# Patient Record
Sex: FEMALE | Race: WHITE | NOT HISPANIC OR LATINO | Employment: OTHER | URBAN - NONMETROPOLITAN AREA
[De-identification: names, ages, dates, MRNs, and addresses within clinical notes are randomized per-mention and may not be internally consistent; named-entity substitution may affect disease eponyms.]

---

## 2021-05-06 DIAGNOSIS — M25.562 LEFT KNEE PAIN, UNSPECIFIED CHRONICITY: Primary | ICD-10-CM

## 2021-05-12 ENCOUNTER — OFFICE VISIT (OUTPATIENT)
Dept: ORTHOPEDIC SURGERY | Facility: CLINIC | Age: 76
End: 2021-05-12

## 2021-05-12 ENCOUNTER — HOSPITAL ENCOUNTER (OUTPATIENT)
Dept: GENERAL RADIOLOGY | Facility: HOSPITAL | Age: 76
Discharge: HOME | End: 2021-05-12
Admitting: ORTHOPAEDIC SURGERY

## 2021-05-12 VITALS
SYSTOLIC BLOOD PRESSURE: 143 MMHG | DIASTOLIC BLOOD PRESSURE: 79 MMHG | HEIGHT: 68 IN | HEART RATE: 65 BPM | WEIGHT: 242.6 LBS | BODY MASS INDEX: 36.77 KG/M2

## 2021-05-12 DIAGNOSIS — M17.12 PRIMARY OSTEOARTHRITIS OF LEFT KNEE: Primary | ICD-10-CM

## 2021-05-12 DIAGNOSIS — M25.562 LEFT KNEE PAIN, UNSPECIFIED CHRONICITY: ICD-10-CM

## 2021-05-12 PROCEDURE — 73562 X-RAY EXAM OF KNEE 3: CPT

## 2021-05-12 PROCEDURE — 73562 X-RAY EXAM OF KNEE 3: CPT | Performed by: RADIOLOGY

## 2021-05-12 PROCEDURE — 99203 OFFICE O/P NEW LOW 30 MIN: CPT | Performed by: ORTHOPAEDIC SURGERY

## 2021-05-12 PROCEDURE — 20610 DRAIN/INJ JOINT/BURSA W/O US: CPT | Performed by: ORTHOPAEDIC SURGERY

## 2021-05-12 RX ORDER — POLYETHYLENE GLYCOL 1000
POWDER (GRAM) MISCELLANEOUS
COMMUNITY

## 2021-05-12 RX ORDER — PANTOPRAZOLE SODIUM 40 MG/1
TABLET, DELAYED RELEASE ORAL
COMMUNITY
Start: 2021-04-15

## 2021-05-12 RX ORDER — LANOLIN ALCOHOL/MO/W.PET/CERES
CREAM (GRAM) TOPICAL
COMMUNITY
Start: 2021-04-15

## 2021-05-12 RX ORDER — METOPROLOL TARTRATE 100 MG/1
TABLET ORAL
COMMUNITY
Start: 2021-04-15

## 2021-05-12 RX ORDER — HYDROCODONE BITARTRATE AND ACETAMINOPHEN 10; 325 MG/1; MG/1
TABLET ORAL
COMMUNITY
Start: 2021-04-17

## 2021-05-12 RX ORDER — LEVOTHYROXINE SODIUM 0.03 MG/1
TABLET ORAL
COMMUNITY
Start: 2021-04-15 | End: 2021-08-09 | Stop reason: DRUGHIGH

## 2021-05-12 RX ORDER — SOTALOL HYDROCHLORIDE 80 MG/1
TABLET ORAL
COMMUNITY
Start: 2021-04-15

## 2021-05-12 RX ORDER — ISOSORBIDE MONONITRATE 30 MG/1
TABLET, EXTENDED RELEASE ORAL
COMMUNITY
Start: 2021-04-15

## 2021-05-12 RX ORDER — INSULIN GLARGINE 100 [IU]/ML
INJECTION, SOLUTION SUBCUTANEOUS
COMMUNITY
Start: 2021-05-10

## 2021-05-12 RX ORDER — LOSARTAN POTASSIUM 50 MG/1
50 TABLET ORAL DAILY
COMMUNITY

## 2021-05-12 RX ORDER — FUROSEMIDE 40 MG/1
TABLET ORAL
COMMUNITY
Start: 2021-04-15

## 2021-05-12 RX ORDER — ATORVASTATIN CALCIUM 40 MG/1
TABLET, FILM COATED ORAL
COMMUNITY
Start: 2021-04-22

## 2021-05-12 RX ORDER — GABAPENTIN 300 MG/1
CAPSULE ORAL
COMMUNITY
Start: 2021-04-17

## 2021-05-12 RX ORDER — ROPINIROLE 2 MG/1
TABLET, FILM COATED ORAL
COMMUNITY
Start: 2021-04-15

## 2021-05-12 RX ORDER — LORATADINE 10 MG/1
TABLET ORAL
COMMUNITY
Start: 2021-04-15

## 2021-05-12 RX ORDER — ASPIRIN 81 MG/1
TABLET ORAL
COMMUNITY
Start: 2021-02-22

## 2021-05-12 RX ORDER — DIGOXIN 125 UG/1
TABLET ORAL
COMMUNITY
Start: 2021-04-15

## 2021-05-12 RX ORDER — SITAGLIPTIN 100 MG/1
TABLET, FILM COATED ORAL
COMMUNITY
Start: 2021-04-15

## 2021-05-12 RX ORDER — MONTELUKAST SODIUM 10 MG/1
TABLET ORAL
COMMUNITY
Start: 2021-04-15

## 2021-05-12 RX ORDER — BACLOFEN 10 MG/1
TABLET ORAL
COMMUNITY
Start: 2021-04-15

## 2021-05-12 RX ORDER — ESCITALOPRAM OXALATE 10 MG/1
TABLET ORAL
COMMUNITY
Start: 2021-04-15

## 2021-05-12 RX ORDER — PROMETHAZINE HYDROCHLORIDE 12.5 MG/1
TABLET ORAL
COMMUNITY
Start: 2021-02-22

## 2021-05-12 RX ADMIN — LIDOCAINE HYDROCHLORIDE 5 ML: 20 INJECTION, SOLUTION INFILTRATION; PERINEURAL at 14:23

## 2021-05-12 RX ADMIN — METHYLPREDNISOLONE ACETATE 80 MG: 80 INJECTION, SUSPENSION INTRA-ARTICULAR; INTRALESIONAL; INTRAMUSCULAR; SOFT TISSUE at 14:23

## 2021-05-13 PROBLEM — M17.12 PRIMARY OSTEOARTHRITIS OF LEFT KNEE: Status: ACTIVE | Noted: 2021-05-13

## 2021-05-19 RX ORDER — METHYLPREDNISOLONE ACETATE 80 MG/ML
80 INJECTION, SUSPENSION INTRA-ARTICULAR; INTRALESIONAL; INTRAMUSCULAR; SOFT TISSUE
Status: COMPLETED | OUTPATIENT
Start: 2021-05-12 | End: 2021-05-12

## 2021-05-19 RX ORDER — LIDOCAINE HYDROCHLORIDE 20 MG/ML
5 INJECTION, SOLUTION INFILTRATION; PERINEURAL
Status: COMPLETED | OUTPATIENT
Start: 2021-05-12 | End: 2021-05-12

## 2021-06-23 ENCOUNTER — HOSPITAL ENCOUNTER (OUTPATIENT)
Dept: GENERAL RADIOLOGY | Facility: HOSPITAL | Age: 76
Discharge: HOME | End: 2021-06-23
Admitting: ORTHOPAEDIC SURGERY

## 2021-06-23 ENCOUNTER — OFFICE VISIT (OUTPATIENT)
Dept: ORTHOPEDIC SURGERY | Facility: CLINIC | Age: 76
End: 2021-06-23

## 2021-06-23 VITALS — HEIGHT: 68 IN | TEMPERATURE: 98.6 F | BODY MASS INDEX: 36.68 KG/M2 | WEIGHT: 242 LBS

## 2021-06-23 DIAGNOSIS — R20.2 NUMBNESS AND TINGLING OF LEFT LOWER EXTREMITY: Primary | ICD-10-CM

## 2021-06-23 DIAGNOSIS — R20.0 NUMBNESS AND TINGLING OF LEFT LOWER EXTREMITY: Primary | ICD-10-CM

## 2021-06-23 DIAGNOSIS — M54.50 LUMBAR PAIN: ICD-10-CM

## 2021-06-23 PROCEDURE — 72100 X-RAY EXAM L-S SPINE 2/3 VWS: CPT

## 2021-06-23 PROCEDURE — 72100 X-RAY EXAM L-S SPINE 2/3 VWS: CPT | Performed by: RADIOLOGY

## 2021-06-23 PROCEDURE — 99213 OFFICE O/P EST LOW 20 MIN: CPT | Performed by: ORTHOPAEDIC SURGERY

## 2021-06-23 NOTE — PROGRESS NOTES
Follow-up Visit         Patient: Lynne Combs  YOB: 1945  Date of Encounter: 06/23/2021      Chief  Complaint:   Chief Complaint   Patient presents with   • Left Knee - Follow-up, Pain         HPI:  Lynne Combs, 76 y.o. female presents in follow-up left leg pain with history of arthritis left knee she reports receiving steroid injection left knee in the past with good response she was last seen May 12, 2021 and provided intra-articular steroid injection describes only a few days relief.  That injection included aspiration with removal of 20 cc of serous fluid.  Reports that she received a few days relief and her pain is back.  She relates her increase in left leg pain to her stroke sustained January 2021 she reports being at the [facility] when her leg pain increased this was following her stroke.  Describes pain from her mid thigh to her foot.  She describes numbness in her foot at times.  She also gives history of back problems in the past and describes consultation with neurosurgeon in which she was offered surgery 10 years ago she did not return for follow-up.      Medical History:  Patient Active Problem List   Diagnosis   • Primary osteoarthritis of left knee     Past Medical History:   Diagnosis Date   • Arrhythmia    • Asthma    • Diabetes (CMS/[facility])    • GERD (gastroesophageal reflux disease)    • High cholesterol    • Hypertension    • Seizure disorder (CMS/[facility])    • Sleep apnea    • Stroke (cerebrum) (CMS/[facility])          Social History:  Social History     Socioeconomic History   • Marital status:      Spouse name: Not on file   • Number of children: Not on file   • Years of education: Not on file   • Highest education level: Not on file   Tobacco Use   • Smoking status: Never Smoker   • Smokeless tobacco: Never Used   Vaping Use   • Vaping Use: Never used   Substance and Sexual Activity   • Alcohol use: Never   • Drug use: Never   • Sexual activity: Defer          Current Medications:    Current Outpatient Medications:   •  Aspirin Adult Low Strength 81 MG EC tablet, , Disp: , Rfl:   •  atorvastatin (LIPITOR) 40 MG tablet, , Disp: , Rfl:   •  baclofen (LIORESAL) 10 MG tablet, , Disp: , Rfl:   •  Diclofenac Sodium (VOLTAREN) 1 % gel gel, , Disp: , Rfl:   •  Digox 125 MCG tablet, , Disp: , Rfl:   •  escitalopram (LEXAPRO) 10 MG tablet, , Disp: , Rfl:   •  ferrous sulfate 325 (65 FE) MG EC tablet, , Disp: , Rfl:   •  furosemide (LASIX) 40 MG tablet, , Disp: , Rfl:   •  gabapentin (NEURONTIN) 300 MG capsule, , Disp: , Rfl:   •  HYDROcodone-acetaminophen (NORCO)  MG per tablet, , Disp: , Rfl:   •  isosorbide mononitrate (IMDUR) 30 MG 24 hr tablet, , Disp: , Rfl:   •  Januvia 100 MG tablet, , Disp: , Rfl:   •  Lantus SoloStar 100 UNIT/ML injection pen, , Disp: , Rfl:   •  levothyroxine (SYNTHROID, LEVOTHROID) 25 MCG tablet, , Disp: , Rfl:   •  loratadine (CLARITIN) 10 MG tablet, , Disp: , Rfl:   •  losartan (COZAAR) 50 MG tablet, Take 50 mg by mouth Daily., Disp: , Rfl:   •  Magnesium Carbonate 250 MG/GM powder, Take  by mouth., Disp: , Rfl:   •  metoprolol tartrate (LOPRESSOR) 100 MG tablet, , Disp: , Rfl:   •  montelukast (SINGULAIR) 10 MG tablet, , Disp: , Rfl:   •  pantoprazole (PROTONIX) 40 MG EC tablet, , Disp: , Rfl:   •  Polyethylene Glycol 1000 powder, , Disp: , Rfl:   •  promethazine (PHENERGAN) 12.5 MG tablet, , Disp: , Rfl:   •  rOPINIRole (REQUIP) 2 MG tablet, , Disp: , Rfl:   •  sotalol (BETAPACE) 80 MG tablet, , Disp: , Rfl:       Allergies:  No Known Allergies      Family History:  Family History   Problem Relation Age of Onset   • Alzheimer's disease Father          Surgical History:  Past Surgical History:   Procedure Laterality Date   • APPENDECTOMY     • HYSTERECTOMY           Radiology:   XR Spine Lumbar 2 or 3 View    Result Date: 6/23/2021  Arthritic change.  This report was finalized on 6/23/2021 2:49 PM by Dr. Dave Duke MD.       Radiographs of lumbar spine today show advanced degenerative changes lower lumbar level with significant disc height loss.      Examination:   Examination lower extremity reveals straight leg raising positive at 40 degrees.  She has diminished sensation to the dorsum of her foot.  Knee exam reveals minimal effusion with mild medial joint line tenderness motion is full.   lumbar spine is straight and nontender in the lower lumbar area.    Assessment & Plan:   76 y.o. female returns in follow-up with left lower extremity pain history of left-sided stroke 6 months ago and history of chronic low back problems affecting her left leg.  With her limited response to intra-articular steroid injection convinced that her symptoms are entirely related to her left knee osteoarthritis.  Today we obtained radiographs of her lumbar spine showing advanced degenerative changes and disc space narrowing at a lower level.  We will request MRI of her lower lumbar spine and she will return once that is completed.         Diagnosis Plan   1. Numbness and tingling of left lower extremity  XR Spine Lumbar 2 or 3 View    MRI Lumbar Spine Without Contrast   2. Lumbar pain  XR Spine Lumbar 2 or 3 View    MRI Lumbar Spine Without Contrast             Cc:  Terrence Mclain MD              This document has been electronically signed by Jose Armando Marquez MD   June 23, 2021 15:36 EDT

## 2021-07-07 ENCOUNTER — HOSPITAL ENCOUNTER (OUTPATIENT)
Dept: MRI IMAGING | Facility: HOSPITAL | Age: 76
Discharge: HOME | End: 2021-07-07
Admitting: ORTHOPAEDIC SURGERY

## 2021-07-07 DIAGNOSIS — R20.2 NUMBNESS AND TINGLING OF LEFT LOWER EXTREMITY: ICD-10-CM

## 2021-07-07 DIAGNOSIS — M54.50 LUMBAR PAIN: ICD-10-CM

## 2021-07-07 DIAGNOSIS — R20.0 NUMBNESS AND TINGLING OF LEFT LOWER EXTREMITY: ICD-10-CM

## 2021-07-07 PROCEDURE — 72148 MRI LUMBAR SPINE W/O DYE: CPT | Performed by: RADIOLOGY

## 2021-07-07 PROCEDURE — 72148 MRI LUMBAR SPINE W/O DYE: CPT

## 2021-07-30 ENCOUNTER — OFFICE VISIT (OUTPATIENT)
Dept: ORTHOPEDIC SURGERY | Facility: CLINIC | Age: 76
End: 2021-07-30

## 2021-07-30 VITALS — HEIGHT: 68 IN | BODY MASS INDEX: 36.75 KG/M2 | WEIGHT: 242.51 LBS | TEMPERATURE: 97.3 F

## 2021-07-30 DIAGNOSIS — M51.16 LUMBAR DISC DISEASE WITH RADICULOPATHY: Primary | ICD-10-CM

## 2021-07-30 DIAGNOSIS — M79.605 LEG PAIN, LEFT: ICD-10-CM

## 2021-07-30 DIAGNOSIS — M51.36 DDD (DEGENERATIVE DISC DISEASE), LUMBAR: ICD-10-CM

## 2021-07-30 PROCEDURE — 99213 OFFICE O/P EST LOW 20 MIN: CPT | Performed by: ORTHOPAEDIC SURGERY

## 2021-08-09 ENCOUNTER — OFFICE VISIT (OUTPATIENT)
Dept: NEUROSURGERY | Facility: CLINIC | Age: 76
End: 2021-08-09

## 2021-08-09 VITALS
SYSTOLIC BLOOD PRESSURE: 132 MMHG | DIASTOLIC BLOOD PRESSURE: 66 MMHG | BODY MASS INDEX: 37.01 KG/M2 | WEIGHT: 244.2 LBS | TEMPERATURE: 96.8 F | HEIGHT: 68 IN

## 2021-08-09 DIAGNOSIS — M51.36 BULGE OF LUMBAR DISC WITHOUT MYELOPATHY: ICD-10-CM

## 2021-08-09 DIAGNOSIS — M51.36 DDD (DEGENERATIVE DISC DISEASE), LUMBAR: Primary | ICD-10-CM

## 2021-08-09 PROCEDURE — 99204 OFFICE O/P NEW MOD 45 MIN: CPT | Performed by: STUDENT IN AN ORGANIZED HEALTH CARE EDUCATION/TRAINING PROGRAM

## 2021-08-09 RX ORDER — LEVOTHYROXINE SODIUM 0.05 MG/1
TABLET ORAL
COMMUNITY
Start: 2021-08-05

## 2021-08-09 NOTE — PROGRESS NOTES
Patient: Lynne Combs  : 1945    Primary Care Provider: Terrence Mclain MD    Requesting Provider: As above      Chief Complaint: Back Pain, Leg Pain (L>R), Numbness (LLE), and Extremity Weakness (LLE)      History of Present Illness: This is a 76 y.o. female who presents several months of left lower extremity pain.  The patient sustained a stroke last year resulting in weakness of her left upper extremity.  During the recovery of that stroke, she started to develop pain in her left lower extremity.  Describes his pain as burning in nature.  It starts in her back and radiates down the posterior aspect of her leg.  She endorses subjective weakness from this pain but denies any bowel or bladder dysfunction.  The patient was being followed by an orthopedic surgeon for her lower extremity pain he attempted a intra-articular knee injection which she states improved her pain for approximately 2 to 3 days.  Her pain then returned.  She is currently taking hydrocodone for pain management.  She was referred to me for evaluation of her lumbar spine as a cause of her left lower extremity pain.    PMHX  Allergies:  No Known Allergies  Medications    Current Outpatient Medications:   •  Aspirin Adult Low Strength 81 MG EC tablet, , Disp: , Rfl:   •  atorvastatin (LIPITOR) 40 MG tablet, , Disp: , Rfl:   •  baclofen (LIORESAL) 10 MG tablet, , Disp: , Rfl:   •  Diclofenac Sodium (VOLTAREN) 1 % gel gel, , Disp: , Rfl:   •  Digox 125 MCG tablet, , Disp: , Rfl:   •  escitalopram (LEXAPRO) 10 MG tablet, , Disp: , Rfl:   •  ferrous sulfate 325 (65 FE) MG EC tablet, , Disp: , Rfl:   •  furosemide (LASIX) 40 MG tablet, , Disp: , Rfl:   •  gabapentin (NEURONTIN) 300 MG capsule, , Disp: , Rfl:   •  HYDROcodone-acetaminophen (NORCO)  MG per tablet, , Disp: , Rfl:   •  isosorbide mononitrate (IMDUR) 30 MG 24 hr tablet, , Disp: , Rfl:   •  Januvia 100 MG tablet, , Disp: , Rfl:   •  Lantus SoloStar 100 UNIT/ML injection  pen, , Disp: , Rfl:   •  loratadine (CLARITIN) 10 MG tablet, , Disp: , Rfl:   •  losartan (COZAAR) 50 MG tablet, Take 50 mg by mouth Daily., Disp: , Rfl:   •  Magnesium Carbonate 250 MG/GM powder, Take  by mouth., Disp: , Rfl:   •  metoprolol tartrate (LOPRESSOR) 100 MG tablet, , Disp: , Rfl:   •  montelukast (SINGULAIR) 10 MG tablet, , Disp: , Rfl:   •  pantoprazole (PROTONIX) 40 MG EC tablet, , Disp: , Rfl:   •  Polyethylene Glycol 1000 powder, , Disp: , Rfl:   •  promethazine (PHENERGAN) 12.5 MG tablet, , Disp: , Rfl:   •  rOPINIRole (REQUIP) 2 MG tablet, , Disp: , Rfl:   •  sotalol (BETAPACE) 80 MG tablet, , Disp: , Rfl:   •  levothyroxine (SYNTHROID, LEVOTHROID) 50 MCG tablet, , Disp: , Rfl:   Past Medical History:  Past Medical History:   Diagnosis Date   • Arrhythmia    • Asthma    • Diabetes (CMS/[facility])    • GERD (gastroesophageal reflux disease)    • High cholesterol    • Hypertension    • Seizure disorder (CMS/[facility])    • Sleep apnea    • Stroke (cerebrum) (CMS/[facility])      Past Surgical History:  Past Surgical History:   Procedure Laterality Date   • APPENDECTOMY     • HYSTERECTOMY       Social Hx:  Social History     Tobacco Use   • Smoking status: Never Smoker   • Smokeless tobacco: Never Used   Vaping Use   • Vaping Use: Never used   Substance Use Topics   • Alcohol use: Never   • Drug use: Never     Family Hx:  Family History   Problem Relation Age of Onset   • Alzheimer's disease Father      Review of Systems:        Review of Systems   Constitutional: Negative for activity change, appetite change, chills, diaphoresis, fatigue, fever and unexpected weight change.   HENT: Negative for congestion, dental problem, drooling, ear discharge, ear pain, facial swelling, hearing loss, mouth sores, nosebleeds, postnasal drip, rhinorrhea, sinus pressure, sneezing, sore throat, tinnitus, trouble swallowing and voice change.    Eyes: Negative for photophobia, pain, discharge, redness, itching and visual disturbance.  "  Respiratory: Negative for apnea, cough, choking, chest tightness, shortness of breath, wheezing and stridor.    Cardiovascular: Negative for chest pain, palpitations and leg swelling.   Gastrointestinal: Negative for abdominal distention, abdominal pain, anal bleeding, blood in stool, constipation, diarrhea, nausea, rectal pain and vomiting.   Endocrine: Negative for cold intolerance, heat intolerance, polydipsia, polyphagia and polyuria.   Genitourinary: Negative for decreased urine volume, difficulty urinating, dysuria, enuresis, flank pain, frequency, genital sores, hematuria and urgency.   Musculoskeletal: Positive for back pain. Negative for arthralgias, gait problem, joint swelling, myalgias, neck pain and neck stiffness.   Skin: Negative for color change, pallor, rash and wound.   Allergic/Immunologic: Negative for environmental allergies, food allergies and immunocompromised state.   Neurological: Negative for dizziness, tremors, seizures, syncope, facial asymmetry, speech difficulty, weakness, light-headedness, numbness and headaches.   Hematological: Negative for adenopathy. Does not bruise/bleed easily.   Psychiatric/Behavioral: Negative for agitation, behavioral problems, confusion, decreased concentration, dysphoric mood, hallucinations, self-injury, sleep disturbance and suicidal ideas. The patient is not nervous/anxious and is not hyperactive.    All other systems reviewed and are negative.       Physical Exam:   /66 (BP Location: Right arm, Patient Position: Sitting, Cuff Size: Adult)   Temp 96.8 °F (36 °C) (Infrared)   Ht 171.5 cm (67.5\")   Wt 111 kg (244 lb 3.2 oz)   BMI 37.68 kg/m²   Awake, alert and oriented x 3  Speech f/c  Opens eyes spont  Pupils 3 mm rx bilaterally  EOMI  Normal sensation to light touch in all 3 distributions of CN V bilaterally  FS  TML  5/5 in all 4 ext  Normal sensation to light touch in all 4 ext  2+DTR's  No howell's or clonus bilaterally  No PD or " dysmetria  Gait not assessed    Diagnostic Studies:  All neurological imaging studies were independently reviewed unless stated otherwise    Assessment/Plan:  This is a 76 y.o. female presenting with 1 year history of left lower extremity pain.  In reviewing the patient's MRI she has mild degenerative changes diffusely.  She does have neural foraminal stenosis and a disc bulge at the L5-S1 level which does cause mild compression of the traversing left S1 nerve root. It is possible that this is the cause of her left lower extremity pain, however the fact that she had significant improvement with an intra-articular knee injection makes me suspicious that this is not the etiology of her pain.  To better delineate this, I am going to refer her for a selective nerve root block of the left S1 nerve root.  Additionally, I will refer her for physical therapy.  We will have the patient return to clinic in 3 months to evaluate how she is doing.

## 2021-08-13 ENCOUNTER — PATIENT ROUNDING (BHMG ONLY) (OUTPATIENT)
Dept: NEUROSURGERY | Facility: CLINIC | Age: 76
End: 2021-08-13

## 2021-08-13 NOTE — PROGRESS NOTES
August 13, 2021    Hello, may I speak with Lynne Combs?    My name is Kellie Long    I am  with [facility]  [address]  [place] [address] [phone].    Before we get started may I verify your date of birth? 1945    I am calling to officially welcome you to our practice and ask about your recent visit. Is this a good time to talk? YES    Tell me about your visit with us. What things went well?  EVERYTHING WENT WELL       We're always looking for ways to make our patients' experiences even better. Do you have recommendations on ways we may improve?  NOTHING I CAN THINK OF    Overall were you satisfied with your first visit to our practice? YES       I appreciate you taking the time to speak with me today. Is there anything else I can do for you? NO      Thank you, and have a great day.